# Patient Record
Sex: FEMALE | Race: WHITE | NOT HISPANIC OR LATINO | URBAN - METROPOLITAN AREA
[De-identification: names, ages, dates, MRNs, and addresses within clinical notes are randomized per-mention and may not be internally consistent; named-entity substitution may affect disease eponyms.]

---

## 2017-08-20 ENCOUNTER — EMERGENCY (EMERGENCY)
Facility: HOSPITAL | Age: 47
LOS: 1 days | Discharge: HOME | End: 2017-08-20
Attending: EMERGENCY MEDICINE | Admitting: EMERGENCY MEDICINE
Payer: COMMERCIAL

## 2017-08-20 VITALS
OXYGEN SATURATION: 100 % | DIASTOLIC BLOOD PRESSURE: 74 MMHG | HEART RATE: 72 BPM | SYSTOLIC BLOOD PRESSURE: 126 MMHG | RESPIRATION RATE: 18 BRPM | WEIGHT: 88.18 LBS | TEMPERATURE: 98 F

## 2017-08-20 DIAGNOSIS — R10.9 UNSPECIFIED ABDOMINAL PAIN: ICD-10-CM

## 2017-08-20 DIAGNOSIS — R10.2 PELVIC AND PERINEAL PAIN: ICD-10-CM

## 2017-08-20 LAB
ALBUMIN SERPL ELPH-MCNC: 4.7 G/DL — SIGNIFICANT CHANGE UP (ref 3.3–5)
ALP SERPL-CCNC: 75 U/L — SIGNIFICANT CHANGE UP (ref 40–120)
ALT FLD-CCNC: 15 U/L — SIGNIFICANT CHANGE UP (ref 10–45)
ANION GAP SERPL CALC-SCNC: 19 MMOL/L — HIGH (ref 5–17)
AST SERPL-CCNC: 24 U/L — SIGNIFICANT CHANGE UP (ref 10–40)
BASOPHILS NFR BLD AUTO: 0.2 % — SIGNIFICANT CHANGE UP (ref 0–2)
BILIRUB SERPL-MCNC: 0.5 MG/DL — SIGNIFICANT CHANGE UP (ref 0.2–1.2)
BUN SERPL-MCNC: 16 MG/DL — SIGNIFICANT CHANGE UP (ref 7–23)
CALCIUM SERPL-MCNC: 9.7 MG/DL — SIGNIFICANT CHANGE UP (ref 8.4–10.5)
CHLORIDE SERPL-SCNC: 102 MMOL/L — SIGNIFICANT CHANGE UP (ref 96–108)
CO2 SERPL-SCNC: 16 MMOL/L — LOW (ref 22–31)
CREAT SERPL-MCNC: 0.7 MG/DL — SIGNIFICANT CHANGE UP (ref 0.5–1.3)
EOSINOPHIL NFR BLD AUTO: 0.1 % — SIGNIFICANT CHANGE UP (ref 0–6)
GLUCOSE SERPL-MCNC: 134 MG/DL — HIGH (ref 70–99)
HCT VFR BLD CALC: 37.5 % — SIGNIFICANT CHANGE UP (ref 34.5–45)
HGB BLD-MCNC: 13.3 G/DL — SIGNIFICANT CHANGE UP (ref 11.5–15.5)
LIDOCAIN IGE QN: 26 U/L — SIGNIFICANT CHANGE UP (ref 7–60)
LYMPHOCYTES # BLD AUTO: 8.2 % — LOW (ref 13–44)
MCHC RBC-ENTMCNC: 30 PG — SIGNIFICANT CHANGE UP (ref 27–34)
MCHC RBC-ENTMCNC: 35.5 G/DL — SIGNIFICANT CHANGE UP (ref 32–36)
MCV RBC AUTO: 84.5 FL — SIGNIFICANT CHANGE UP (ref 80–100)
MONOCYTES NFR BLD AUTO: 2.9 % — SIGNIFICANT CHANGE UP (ref 2–14)
NEUTROPHILS NFR BLD AUTO: 88.6 % — HIGH (ref 43–77)
PLATELET # BLD AUTO: 202 K/UL — SIGNIFICANT CHANGE UP (ref 150–400)
POTASSIUM SERPL-MCNC: 4.1 MMOL/L — SIGNIFICANT CHANGE UP (ref 3.5–5.3)
POTASSIUM SERPL-SCNC: 4.1 MMOL/L — SIGNIFICANT CHANGE UP (ref 3.5–5.3)
PROT SERPL-MCNC: 7.6 G/DL — SIGNIFICANT CHANGE UP (ref 6–8.3)
RBC # BLD: 4.44 M/UL — SIGNIFICANT CHANGE UP (ref 3.8–5.2)
RBC # FLD: 13.3 % — SIGNIFICANT CHANGE UP (ref 10.3–16.9)
SODIUM SERPL-SCNC: 137 MMOL/L — SIGNIFICANT CHANGE UP (ref 135–145)
WBC # BLD: 9.7 K/UL — SIGNIFICANT CHANGE UP (ref 3.8–10.5)
WBC # FLD AUTO: 9.7 K/UL — SIGNIFICANT CHANGE UP (ref 3.8–10.5)

## 2017-08-20 PROCEDURE — 36415 COLL VENOUS BLD VENIPUNCTURE: CPT

## 2017-08-20 PROCEDURE — 76856 US EXAM PELVIC COMPLETE: CPT

## 2017-08-20 PROCEDURE — 99284 EMERGENCY DEPT VISIT MOD MDM: CPT | Mod: 25

## 2017-08-20 PROCEDURE — 81001 URINALYSIS AUTO W/SCOPE: CPT

## 2017-08-20 PROCEDURE — 99285 EMERGENCY DEPT VISIT HI MDM: CPT

## 2017-08-20 PROCEDURE — 96374 THER/PROPH/DIAG INJ IV PUSH: CPT

## 2017-08-20 PROCEDURE — 83690 ASSAY OF LIPASE: CPT

## 2017-08-20 PROCEDURE — 84702 CHORIONIC GONADOTROPIN TEST: CPT

## 2017-08-20 PROCEDURE — 80053 COMPREHEN METABOLIC PANEL: CPT

## 2017-08-20 PROCEDURE — 85025 COMPLETE CBC W/AUTO DIFF WBC: CPT

## 2017-08-20 PROCEDURE — 76856 US EXAM PELVIC COMPLETE: CPT | Mod: 26

## 2017-08-20 RX ORDER — MORPHINE SULFATE 50 MG/1
4 CAPSULE, EXTENDED RELEASE ORAL ONCE
Qty: 0 | Refills: 0 | Status: DISCONTINUED | OUTPATIENT
Start: 2017-08-20 | End: 2017-08-20

## 2017-08-20 RX ADMIN — MORPHINE SULFATE 4 MILLIGRAM(S): 50 CAPSULE, EXTENDED RELEASE ORAL at 20:57

## 2017-08-20 NOTE — ED PROVIDER NOTE - CONDUCTED A DETAILED DISCUSSION WITH PATIENT AND/OR GUARDIAN REGARDING, MDM
radiology results/lab results need for outpatient follow-up/return to ED if symptoms worsen, persist or questions arise/lab results/radiology results

## 2017-08-20 NOTE — ED PROVIDER NOTE - PHYSICAL EXAMINATION
CONSTITUTIONAL: Well-appearing; well-nourished; in no apparent distress.   HEAD: Normocephalic; atraumatic.   EYES:  conjunctiva and sclera clear  ENT: normal nose; no rhinorrhea; normal pharynx with no erythema or lesions.   NECK: Supple; non-tender;   CARDIOVASCULAR: Normal S1, S2; no murmurs, rubs, or gallops. Regular rate and rhythm.   RESPIRATORY: Breathing easily; breath sounds clear and equal bilaterally; no wheezes, rhonchi, or rales.  GI: Soft; non-distended; +diffuse low pelvic tenderness; no palpable organomegaly. no cva tenderness  EXT: No cyanosis or edema; N/V intact  SKIN: Normal for age and race; warm; dry; good turgor; no apparent lesions or rash.   NEURO: A & O x 3; face symmetric; grossly unremarkable.   PSYCHOLOGICAL: The patient’s mood and manner are appropriate.

## 2017-08-20 NOTE — ED PROVIDER NOTE - MEDICAL DECISION MAKING DETAILS
here w/ increased pain after egg retrieval attempted today. Plan for labs, pelvic US, and GYN eval. Pt feeling much better after morphine, but will need observation in ED if US wnl.

## 2017-08-20 NOTE — ED ADULT NURSE NOTE - OBJECTIVE STATEMENT
patient alert and oriented x 3 came c/o severe lower abdominal pain associated with nausea and vomiting started at 12:30 pm today , pt. is on IVF cycle and retrieval of the egg was done at 10 am this morning . Denies any vaginal bleeding , fever nor chills . Awaiting to be seen .

## 2017-08-20 NOTE — ED PROVIDER NOTE - PROGRESS NOTE DETAILS
US neg. On my re-exam, pt is completely non tender. she states she is pain free. CT w/ po and IV contrast offered as bowel/bladder perf possible, but pt prefers to avoid ct for now. plan for obs, gyn re-eval, and if pain reoccurs, do CT scan cleared by GYN for discharge.  patient is pain-free and tolerating PO fluids.  she will call Dr Rodney in the morning.

## 2017-08-20 NOTE — ED PROVIDER NOTE - OBJECTIVE STATEMENT
48yo F hx of IVF s/p 12 cycles, with egg retreival attempted today and unsuccessful. went home after the anesthesia wore off w/ no issues. started to develop pain soon after, took tylenol, then started vomiting. then took ibuprofen, but vomited again. unable to tolerate the pain. called her clinic and spoke w/ rn who told her to come to any ED. Pt denies any vaginal bleeding.

## 2017-08-20 NOTE — ED ADULT NURSE NOTE - CHPI ED SYMPTOMS NEG
no blood in stool/no fever/no diarrhea/no hematuria/no dysuria/no abdominal distension/no burning urination/no chills

## 2017-08-21 VITALS
TEMPERATURE: 98 F | HEART RATE: 69 BPM | RESPIRATION RATE: 18 BRPM | DIASTOLIC BLOOD PRESSURE: 66 MMHG | OXYGEN SATURATION: 96 % | SYSTOLIC BLOOD PRESSURE: 100 MMHG

## 2017-08-21 LAB
APPEARANCE UR: CLEAR — SIGNIFICANT CHANGE UP
BILIRUB UR-MCNC: NEGATIVE — SIGNIFICANT CHANGE UP
COLOR SPEC: YELLOW — SIGNIFICANT CHANGE UP
DIFF PNL FLD: (no result)
EPI CELLS # UR: (no result) /HPF
GLUCOSE UR QL: NEGATIVE — SIGNIFICANT CHANGE UP
KETONES UR-MCNC: (no result) MG/DL
LEUKOCYTE ESTERASE UR-ACNC: NEGATIVE — SIGNIFICANT CHANGE UP
NITRITE UR-MCNC: NEGATIVE — SIGNIFICANT CHANGE UP
PH UR: 5.5 — SIGNIFICANT CHANGE UP (ref 5–8)
PROT UR-MCNC: NEGATIVE MG/DL — SIGNIFICANT CHANGE UP
RBC CASTS # UR COMP ASSIST: < 5 /HPF — SIGNIFICANT CHANGE UP
SP GR SPEC: 1.01 — SIGNIFICANT CHANGE UP (ref 1–1.03)
UROBILINOGEN FLD QL: 0.2 E.U./DL — SIGNIFICANT CHANGE UP
WBC UR QL: < 5 /HPF — SIGNIFICANT CHANGE UP

## 2017-08-21 NOTE — CONSULT NOTE ADULT - ASSESSMENT
48 yo G0 s/p egg retrieval with abdominal pain and nausea/vomiting, improved after pain medication.  Nausea and vomiting now resolved. Pain has also resolved. Ultrasound ruled out hematoma, torsion, ovarian hyperstimulation syndrome.   Discharge home; patient to follow up at her [facility] clinic today.  Patient to return to ED if pain worsens, develops dizziness/lightheadedness, nausea/vomiting recur.     Plan discussed with Dr. Rodney, patient's [facility] physician.

## 2017-08-21 NOTE — CONSULT NOTE ADULT - SUBJECTIVE AND OBJECTIVE BOX
48 yo G0 s/p egg retrieval yesterday morning presents with abdominal pain since early afternoon. Pain is located in LLQ, RLQ, R flank. Patient also had 4 episodes of NBNB emesis from noon to 5 pm, has not vomited since arriving to the hospital. Denies dysuria, hematuria, vaginal bleeding. Patient has had several egg retrievals in the past year, has not had any pain or nausea post-procedures. Denies fever/chills, dizziness, headache, weakness.     Patient states pain not relieved by Tylenol or Motrin at home. In the ED, patient received Morphine and states pain is better and she now denies nausea.    Obhx: G0  Gynhx: regular periods, no hx of PID or STDs, has had egg retrievals monthly for the past year for infertility, s/p hysteroscopic polypectomy in , multiple fibroids no myomectomy  PMH: denies  PSH: hysteroscopic polypectomy, egg retrievals  Meds: denies  NKDA    Afebrile  BP 98/57 HR 86  Gen: NAD, AOx3  Chest: normal work of breathing  Back: no CVA tenderness  Abdomen: soft, nondistended, mild tenderness to deep palpation in LLQ, no rebound or guarding  Pelvic exam deferred per patient  Extremities: PAIGE, ASHOKP                          13.3   9.7   )-----------( 202      ( 20 Aug 2017 19:39 )             37.5   08-    137  |  102  |  16  ----------------------------<  134<H>  4.1   |  16<L>  |  0.70    Ca    9.7      20 Aug 2017 19:39    TPro  7.6  /  Alb  4.7  /  TBili  0.5  /  DBili  x   /  AST  24  /  ALT  15  /  AlkPhos  75  -    Urinalysis Basic - ( 21 Aug 2017 00:07 )    Color: Yellow / Appearance: Clear / S.010 / pH: x  Gluc: x / Ketone: Trace mg/dL  / Bili: NEGATIVE / Urobili: 0.2 E.U./dL   Blood: x / Protein: NEGATIVE mg/dL / Nitrite: NEGATIVE   Leuk Esterase: NEGATIVE / RBC: < 5 /HPF / WBC < 5 /HPF   Sq Epi: x / Non Sq Epi: Moderate /HPF / Bacteria: x        < from: US Pelvis Complete (17 @ 22:49) >  INTERPRETATION:  PELVIC ULTRASOUND dated 2017 10:49 PM    INDICATION: Right sided pain status post IVF retrieval today.     TECHNIQUE: Transvaginal views of the uterus were obtained.    PRIOR STUDIES: None    FINDINGS:   These images demonstrate the uterus to be anteverted. The uterus is 8.0 x   4.7 x 6.0 cm. The endometrium is not visualized due to presence of   multiple fibroids.    The right ovary is normal in size, measuring 3.8 x 4.1 x 2.6 cm. No right   ovarian masses are seen. The left ovary is normal in size, measuring 4.2   x 2.5 x 2.3 cm. No left ovarian masses are seen. Doppler evaluation   demonstrates flow to both ovaries with no evidence of torsion.    No free fluid is seen in the cul-de-sac.      IMPRESSION:   1.  No free fluid.  2.  No evidence of ovarian torsion.  3.  Fibroid uterus.    < end of copied text >